# Patient Record
Sex: FEMALE | Race: WHITE | Employment: FULL TIME | URBAN - METROPOLITAN AREA
[De-identification: names, ages, dates, MRNs, and addresses within clinical notes are randomized per-mention and may not be internally consistent; named-entity substitution may affect disease eponyms.]

---

## 2017-03-31 ENCOUNTER — OFFICE VISIT (OUTPATIENT)
Dept: ENDOCRINOLOGY | Facility: CLINIC | Age: 57
End: 2017-03-31

## 2017-03-31 VITALS
HEART RATE: 72 BPM | HEIGHT: 63 IN | SYSTOLIC BLOOD PRESSURE: 129 MMHG | TEMPERATURE: 97.7 F | OXYGEN SATURATION: 98 % | DIASTOLIC BLOOD PRESSURE: 74 MMHG | WEIGHT: 235.5 LBS | BODY MASS INDEX: 41.73 KG/M2

## 2017-03-31 DIAGNOSIS — E66.01 MORBID OBESITY DUE TO EXCESS CALORIES (H): ICD-10-CM

## 2017-03-31 RX ORDER — TOPIRAMATE 25 MG/1
TABLET, FILM COATED ORAL
Qty: 150 TABLET | Refills: 5 | Status: SHIPPED | OUTPATIENT
Start: 2017-03-31 | End: 2017-08-04

## 2017-03-31 ASSESSMENT — PAIN SCALES - GENERAL: PAINLEVEL: NO PAIN (0)

## 2017-03-31 NOTE — NURSING NOTE
"Chief Complaint   Patient presents with     RECHECK     F/U       Vitals:    03/31/17 0851   BP: 129/74   BP Location: Left arm   Patient Position: Chair   Cuff Size: Adult Large   Pulse: 72   Temp: 97.7  F (36.5  C)   SpO2: 98%   Weight: 106.8 kg (235 lb 8 oz)   Height: 1.6 m (5' 3\")       Body mass index is 41.72 kg/(m^2).    Kaycee Chowdhury                          "

## 2017-03-31 NOTE — PROGRESS NOTES
"    Return Medical Weight Management Note     Marisol Anne  MRN:  [id number]  :  1960  BILL:  3/31//2017    Dear Dr. Gallegos,    I had the pleasure of seeing your patient Marisol Anne.  She is a 55 year old female who I am continuing to see for treatment of obesity related to: Back Pain, Knee Pain, GERD, Depression and anxiety    CURRENT WEIGHT:   235 lbs 8 oz    Wt Readings from Last 4 Encounters:   17 106.8 kg (235 lb 8 oz)   16 108.8 kg (239 lb 14.4 oz)   16 120.7 kg (266 lb 1.6 oz)   16 125.7 kg (277 lb 1 oz)     Height:  5' 3\"  Body Mass Index:  Body mass index is 41.72 kg/(m^2).  Vitals:  B/P: 150/76, P: 73    Initial consult weight was 285 on 12/11/15.  Weight change since last seen on  16 is down 4 pounds.   Total loss is 50 pounds.    INTERVAL HISTORY:  Still able to keep the changes - less snacking and lower starches, topiramate helping -  75 mg AM and 50 PM help with cravings during day.    Diet and Activity Changes Since Last Visit Reviewed With Patient 3/31/2017   I have made the following changes to my diet since my last visit: n0ne   With regards to my diet, I am still struggling with: sweets and eating at night  and mindless eating    For breakfast, I typically eat: protein smoothie and oatmeal   For lunch, I typically eat: chipolte bowl or rajinder hinkle sandwich   For supper, I typically eat: varies   For snack(s), I typically eat: cashews and fruit and sweets ad chips    I have made the following changes to my activity/exercise since my last visit: none   With regards to my activity/exercise, I am still struggling with: daily exercise       ROS    MEDICATIONS:   Current Outpatient Prescriptions   Medication     Probiotic Product (PRO-BIOTIC BLEND PO)     topiramate (TOPAMAX) 25 MG tablet     omeprazole (PRILOSEC) 20 MG capsule     UNABLE TO FIND     Cholecalciferol (VITAMIN D3 PO)     LEVOTHYROXINE SODIUM PO     UNABLE TO FIND     UNABLE TO FIND     " Cyanocobalamin (VITAMIN B 12 PO)     Cyanocobalamin (B-12) 2500 MCG SUBL     No current facility-administered medications for this visit.        Weight Loss Medication History Reviewed With Patient 3/31/2017   Which weight loss medications are you currently taking on a regular basis?  Topamax (topiramate)   Are you having any side effects from the weight loss medication that we have prescribed you? No   If you are having side effects please describe: -     ASSESSMENT:   Continue current plan for food change - focus on no between meal snacking, aggressive lowering of starches and cheese  Maintain topiramate 75 AM/50 PM.    FOLLOW-UP:    12 weeks.  10/15 minutes spent on counseling and education    Sincerely,    Niraj Johnson MD

## 2017-03-31 NOTE — LETTER
"3/31/2017       RE: Marisol Anne  [address]   [facility] [address]     Dear Colleague,    Thank you for referring your patient, Marisol Anne, to the [facility] MEDICAL WEIGHT MANAGEMENT at [facility]. Please see a copy of my visit note below.        Return Medical Weight Management Note     Marisol Anne  MRN:  [id number]  :  1960  BILL:  3/31//2017    Dear Dr. Gallegos,    I had the pleasure of seeing your patient Marisol Anne.  She is a 55 year old female who I am continuing to see for treatment of obesity related to: Back Pain, Knee Pain, GERD, Depression and anxiety    CURRENT WEIGHT:   235 lbs 8 oz    Wt Readings from Last 4 Encounters:   17 106.8 kg (235 lb 8 oz)   16 108.8 kg (239 lb 14.4 oz)   16 120.7 kg (266 lb 1.6 oz)   16 125.7 kg (277 lb 1 oz)     Height:  5' 3\"  Body Mass Index:  Body mass index is 41.72 kg/(m^2).  Vitals:  B/P: 150/76, P: 73    Initial consult weight was 285 on 12/11/15.  Weight change since last seen on  16 is down 4 pounds.   Total loss is 50 pounds.    INTERVAL HISTORY:  Still able to keep the changes - less snacking and lower starches, topiramate helping -  75 mg AM and 50 PM help with cravings during day.    Diet and Activity Changes Since Last Visit Reviewed With Patient 3/31/2017   I have made the following changes to my diet since my last visit: n0ne   With regards to my diet, I am still struggling with: sweets and eating at night  and mindless eating    For breakfast, I typically eat: protein smoothie and oatmeal   For lunch, I typically eat: chipolte bowl or rajinder hinkle sandwich   For supper, I typically eat: varies   For snack(s), I typically eat: cashews and fruit and sweets ad chips    I have made the following changes to my activity/exercise since my last visit: none   With regards to my activity/exercise, I am still struggling with: daily exercise "       ROS    MEDICATIONS:   Current Outpatient Prescriptions   Medication     Probiotic Product (PRO-BIOTIC BLEND PO)     topiramate (TOPAMAX) 25 MG tablet     omeprazole (PRILOSEC) 20 MG capsule     UNABLE TO FIND     Cholecalciferol (VITAMIN D3 PO)     LEVOTHYROXINE SODIUM PO     UNABLE TO FIND     UNABLE TO FIND     Cyanocobalamin (VITAMIN B 12 PO)     Cyanocobalamin (B-12) 2500 MCG SUBL     No current facility-administered medications for this visit.        Weight Loss Medication History Reviewed With Patient 3/31/2017   Which weight loss medications are you currently taking on a regular basis?  Topamax (topiramate)   Are you having any side effects from the weight loss medication that we have prescribed you? No   If you are having side effects please describe: -     ASSESSMENT:   Continue current plan for food change - focus on no between meal snacking, aggressive lowering of starches and cheese  Maintain topiramate 75 AM/50 PM.    FOLLOW-UP:    12 weeks.  10/15 minutes spent on counseling and education    Sincerely,    Niraj Johnson MD

## 2017-03-31 NOTE — MR AVS SNAPSHOT
After Visit Summary   3/31/2017    Marisol Anne    MRN: [id number]           Patient Information     Date Of Birth          1960        Visit Information        Provider Department      3/31/2017 9:00 AM Niraj Johnson MD [facility] Medical Weight Management        Today's Diagnoses     Morbid obesity due to excess calories (H)           Follow-ups after your visit        Follow-up notes from your care team     Return in about 3 months (around 2017).      Who to contact     Please call your clinic at [phone] to:    Ask questions about your health    Make or cancel appointments    Discuss your medicines    Learn about your test results    Speak to your doctor   If you have compliments or concerns about an experience at your clinic, or if you wish to file a complaint, please contact [facility] Patient Relations at [phone] or email us at [email or web address]         Additional Information About Your Visit        MyChart Information     [employer] is an electronic gateway that provides easy, online access to your medical records. With Loladex, you can request a clinic appointment, read your test results, renew a prescription or communicate with your care team.     To sign up for [employer] visit the website at [email or web address]   You will be asked to enter the access code listed below, as well as some personal information. Please follow the directions to create your username and password.     Your access code is: [email or web address]  Expires: 2017  7:30 AM     Your access code will  in 90 days. If you need help or a new code, please contact your [facility] Clinic or call [phone] for assistance.        Care EveryWhere ID     This is your Care EveryWhere ID. This could be used by other organizations to access your [facility] medical records  [id number]        Your Vitals Were     Pulse Temperature  "Height Pulse Oximetry BMI (Body Mass Index)       72 97.7  F (36.5  C) 1.6 m (5' 3\") 98% 41.72 kg/m2        Blood Pressure from Last 3 Encounters:   03/31/17 129/74   12/23/16 147/88   06/07/16 141/79    Weight from Last 3 Encounters:   03/31/17 106.8 kg (235 lb 8 oz)   12/23/16 108.8 kg (239 lb 14.4 oz)   06/07/16 120.7 kg (266 lb 1.6 oz)              Today, you had the following     No orders found for display         Where to get your medicines      These medications were sent to "[facility] [address] [place] [address] & [address], [place] [address]     Phone:  [phone]     topiramate 25 MG tablet          Primary Care Provider Office Phone # Fax #    Adrianna Gallegos [phone]       [facility] [address] [address]        Thank you!     Thank you for choosing [facility] WEIGHT MANAGEMENT  for your care. Our goal is always to provide you with excellent care. Hearing back from our patients is one way we can continue to improve our services. Please take a few minutes to complete the written survey that you may receive in the mail after your visit with us. Thank you!             Your Updated Medication List - Protect others around you: Learn how to safely use, store and throw away your medicines at www.disposemymeds.org.          This list is accurate as of: 3/31/17  9:23 AM.  Always use your most recent med list.                   Brand Name Dispense Instructions for use    LEVOTHYROXINE SODIUM PO      Take 75 mcg by mouth       omeprazole 20 MG CR capsule    priLOSEC         PRO-BIOTIC BLEND PO          topiramate 25 MG tablet    TOPAMAX    150 tablet    Take 3 tabs in the morning and 2 tabs in the evening       * UNABLE TO FIND      MEDICATION NAME: Dynamic Fruits & Greens       * UNABLE TO FIND      MEDICATION NAME: Dynamic Slender Drink       * UNABLE TO FIND      MEDICATION NAME: Probiotic 10 " mariola CFU       * VITAMIN B 12 PO          * B-12 2500 MCG Subl      DIS 1 T UNT QD       VITAMIN D3 PO      Take 2,000 Units by mouth daily       * Notice:  This list has 5 medication(s) that are the same as other medications prescribed for you. Read the directions carefully, and ask your doctor or other care provider to review them with you.

## 2017-07-19 DIAGNOSIS — K21.9 GASTROESOPHAGEAL REFLUX DISEASE, ESOPHAGITIS PRESENCE NOT SPECIFIED: ICD-10-CM

## 2017-07-19 DIAGNOSIS — K21.9 ESOPHAGEAL REFLUX: Primary | ICD-10-CM

## 2017-07-19 NOTE — TELEPHONE ENCOUNTER
omeprazole  Last Written Prescription Date:  Pt reported    Last Office Visit : 3/31/17  Future Office visit:  8/4/17    Routing refill request to provider for review/approval because:  Medication is reported/historical

## 2017-08-04 ENCOUNTER — OFFICE VISIT (OUTPATIENT)
Dept: ENDOCRINOLOGY | Facility: CLINIC | Age: 57
End: 2017-08-04

## 2017-08-04 VITALS
OXYGEN SATURATION: 100 % | DIASTOLIC BLOOD PRESSURE: 75 MMHG | HEART RATE: 71 BPM | SYSTOLIC BLOOD PRESSURE: 133 MMHG | WEIGHT: 230.6 LBS | BODY MASS INDEX: 40.86 KG/M2 | HEIGHT: 63 IN

## 2017-08-04 DIAGNOSIS — E66.01 MORBID OBESITY DUE TO EXCESS CALORIES (H): ICD-10-CM

## 2017-08-04 RX ORDER — TOPIRAMATE 25 MG/1
75 TABLET, FILM COATED ORAL 2 TIMES DAILY
Qty: 180 TABLET | Refills: 5 | Status: SHIPPED | OUTPATIENT
Start: 2017-08-04 | End: 2017-08-07

## 2017-08-04 ASSESSMENT — PAIN SCALES - GENERAL: PAINLEVEL: NO PAIN (0)

## 2017-08-04 NOTE — MR AVS SNAPSHOT
After Visit Summary   2017    Marisol Anne    MRN: [id number]           Patient Information     Date Of Birth          1960        Visit Information        Provider Department      2017 9:15 AM Niraj Johnson MD [facility] Medical Weight Management        Today's Diagnoses     Morbid obesity due to excess calories (H)           Follow-ups after your visit        Follow-up notes from your care team     Return in about 3 months (around 2017).      Who to contact     Please call your clinic at [phone] to:    Ask questions about your health    Make or cancel appointments    Discuss your medicines    Learn about your test results    Speak to your doctor   If you have compliments or concerns about an experience at your clinic, or if you wish to file a complaint, please contact [facility] Patient Relations at [phone] or email us at [email or web address]         Additional Information About Your Visit        MyChart Information     [employer] is an electronic gateway that provides easy, online access to your medical records. With Alaris, you can request a clinic appointment, read your test results, renew a prescription or communicate with your care team.     To sign up for [employer] visit the website at [email or web address]   You will be asked to enter the access code listed below, as well as some personal information. Please follow the directions to create your username and password.     Your access code is: [email or web address]  Expires: 2017  6:30 AM     Your access code will  in 90 days. If you need help or a new code, please contact your [facility] or call [phone] for assistance.        Care EveryWhere ID     This is your Care EveryWhere ID. This could be used by other organizations to access your [facility] medical records  [id number]        Your Vitals Were     Pulse Height Pulse  "Oximetry BMI (Body Mass Index)          71 1.6 m (5' 3\") 100% 40.85 kg/m2         Blood Pressure from Last 3 Encounters:   08/04/17 133/75   03/31/17 129/74   12/23/16 147/88    Weight from Last 3 Encounters:   08/04/17 104.6 kg (230 lb 9.6 oz)   03/31/17 106.8 kg (235 lb 8 oz)   12/23/16 108.8 kg (239 lb 14.4 oz)              Today, you had the following     No orders found for display         Today's Medication Changes          These changes are accurate as of: 8/4/17  9:45 AM.  If you have any questions, ask your nurse or doctor.               These medicines have changed or have updated prescriptions.        Dose/Directions    topiramate 25 MG tablet   Commonly known as:  TOPAMAX   This may have changed:    - how much to take  - how to take this  - when to take this   Used for:  Morbid obesity due to excess calories (H)   Changed by:  Niraj Johnson MD        Dose:  75 mg   Take 3 tablets (75 mg) by mouth 2 times daily Take 3 tabs in the morning and 2 tabs in the evening   Quantity:  180 tablet   Refills:  5         Stop taking these medicines if you haven't already. Please contact your care team if you have questions.     UNABLE TO FIND   Stopped by:  Niraj Johnson MD                Where to get your medicines      These medications were sent to [facility] [address] [place] [address] & [address], [place] [address]     Phone:  [phone]     topiramate 25 MG tablet                Primary Care Provider Office Phone # Fax #    Adrianna Gallegos [phone]       [facility] [address] [address]        Equal Access to Services     KATIANA OLIVEIRA AH: Debbi Thomas, pham guerrero, latisha west, ginny Delarosa [facility] [phone].    ATENCIÓN: Si habla español, tiene a solorzano disposición servicios gratuitos de asistencia lingüística. " Kay delaney [phone].    We comply with applicable federal civil rights laws and [place] laws. We do not discriminate on the basis of race, color, national origin, age, disability sex, sexual orientation or gender identity.            Thank you!     Thank you for choosing [facility] MEDICAL WEIGHT MANAGEMENT  for your care. Our goal is always to provide you with excellent care. Hearing back from our patients is one way we can continue to improve our services. Please take a few minutes to complete the written survey that you may receive in the mail after your visit with us. Thank you!             Your Updated Medication List - Protect others around you: Learn how to safely use, store and throw away your medicines at www.disposemymeds.org.          This list is accurate as of: 8/4/17  9:45 AM.  Always use your most recent med list.                   Brand Name Dispense Instructions for use Diagnosis    LEVOTHYROXINE SODIUM PO      Take 75 mcg by mouth        omeprazole 20 MG CR capsule    priLOSEC    90 capsule    1 cap daily before a meal    Gastroesophageal reflux disease, esophagitis presence not specified       PRO-BIOTIC BLEND PO           topiramate 25 MG tablet    TOPAMAX    180 tablet    Take 3 tablets (75 mg) by mouth 2 times daily Take 3 tabs in the morning and 2 tabs in the evening    Morbid obesity due to excess calories (H)       * VITAMIN B 12 PO           * B-12 2500 MCG Subl      DIS 1 T UNT QD        VITAMIN D3 PO      Take 2,000 Units by mouth daily        * Notice:  This list has 2 medication(s) that are the same as other medications prescribed for you. Read the directions carefully, and ask your doctor or other care provider to review them with you.

## 2017-08-04 NOTE — NURSING NOTE
"Chief Complaint   Patient presents with     Weight Problem     RMWM- 3 Month       Vitals:    08/04/17 0920   BP: 133/75   BP Location: Left arm   Patient Position: Chair   Cuff Size: Adult Regular   Pulse: 71   SpO2: 100%   Weight: 230 lb 9.6 oz   Height: 5' 3\"       Body mass index is 40.85 kg/(m^2).      Kaycee Chowdhury                          "

## 2017-08-04 NOTE — PROGRESS NOTES
"    Return Medical Weight Management Note     Marisol Anne  MRN:  [id number]  :  1960  BILL:  2017    Dear Dr. Gallegos,    I had the pleasure of seeing your patient Marisol Anne.  She is a 55 year old female who I am continuing to see for treatment of obesity related to: Back Pain, Knee Pain, GERD, Depression and anxiety    CURRENT WEIGHT:   230 lbs 9.6 oz    Wt Readings from Last 4 Encounters:   17 104.6 kg (230 lb 9.6 oz)   17 106.8 kg (235 lb 8 oz)   16 108.8 kg (239 lb 14.4 oz)   16 120.7 kg (266 lb 1.6 oz)     Height:  5' 3\"  Body Mass Index:  Body mass index is 40.85 kg/(m^2).  Vitals:  B/P: 150/76, P: 73    Initial consult weight was 285 on 12/11/15.  Weight change since last seen on  3/31/2017 is down 5 pounds.   Total loss is 55 pounds.    INTERVAL HISTORY:  Still able to keep the changes - less snacking and lower starches, topiramate helping, though she self increased -  75 mg AM and 75 PM help with cravings during day.    Diet and Activity Changes Since Last Visit Reviewed With Patient 2017   I have made the following changes to my diet since my last visit: none   With regards to my diet, I am still struggling with: sweets and consistentency   For breakfast, I typically eat: protein drink and oatmeal and fruit    For lunch, I typically eat: varies from sandwich or pizza and fruit chips   For supper, I typically eat: sandwich chips fruit or misc meals    For snack(s), I typically eat: nuts scone fruit sweets chips   I have made the following changes to my activity/exercise since my last visit: walking more and pool at my place .gymtwicea week   With regards to my activity/exercise, I am still struggling with: consistenccty       ROS    MEDICATIONS:   Current Outpatient Prescriptions   Medication     omeprazole (PRILOSEC) 20 MG CR capsule     Probiotic Product (PRO-BIOTIC BLEND PO)     Cyanocobalamin (B-12) 2500 MCG SUBL     topiramate (TOPAMAX) 25 MG tablet     " Cholecalciferol (VITAMIN D3 PO)     LEVOTHYROXINE SODIUM PO     Cyanocobalamin (VITAMIN B 12 PO)     No current facility-administered medications for this visit.        Weight Loss Medication History Reviewed With Patient 8/4/2017   Which weight loss medications are you currently taking on a regular basis?  Topamax (topiramate)   Are you having any side effects from the weight loss medication that we have prescribed you? No   If you are having side effects please describe: -     ASSESSMENT:   Continue current plan for food change - focus on no between meal snacking, aggressive lowering of starches and cheese  Maintain topiramate 75 AM/75 PM.    FOLLOW-UP:    12 weeks.  10/15 minutes spent on counseling and education    Sincerely,    Niraj Johnson MD

## 2017-08-04 NOTE — LETTER
"2017       RE: Marisol Anne  [address]   [place] [facility] [address]     Dear Colleague,    Thank you for referring your patient, Marisol Anne, to the [facility] MEDICAL WEIGHT MANAGEMENT at [facility]. Please see a copy of my visit note below.        Return Medical Weight Management Note     Marisol Anne  MRN:  [id number]  :  1960  BILL:  2017    Dear Dr. Gallegos,    I had the pleasure of seeing your patient Marisol Anne.  She is a 55 year old female who I am continuing to see for treatment of obesity related to: Back Pain, Knee Pain, GERD, Depression and anxiety    CURRENT WEIGHT:   230 lbs 9.6 oz    Wt Readings from Last 4 Encounters:   17 104.6 kg (230 lb 9.6 oz)   17 106.8 kg (235 lb 8 oz)   16 108.8 kg (239 lb 14.4 oz)   16 120.7 kg (266 lb 1.6 oz)     Height:  5' 3\"  Body Mass Index:  Body mass index is 40.85 kg/(m^2).  Vitals:  B/P: 150/76, P: 73    Initial consult weight was 285 on 12/11/15.  Weight change since last seen on  3/31/2017 is down 5 pounds.   Total loss is 55 pounds.    INTERVAL HISTORY:  Still able to keep the changes - less snacking and lower starches, topiramate helping, though she self increased -  75 mg AM and 75 PM help with cravings during day.    Diet and Activity Changes Since Last Visit Reviewed With Patient 2017   I have made the following changes to my diet since my last visit: none   With regards to my diet, I am still struggling with: sweets and consistentency   For breakfast, I typically eat: protein drink and oatmeal and fruit    For lunch, I typically eat: varies from sandwich or pizza and fruit chips   For supper, I typically eat: sandwich chips fruit or misc meals    For snack(s), I typically eat: nuts scone fruit sweets chips   I have made the following changes to my activity/exercise since my last visit: walking more and pool at my place .gymtwicea week   With regards to my " activity/exercise, I am still struggling with: consistenccty       ROS    MEDICATIONS:   Current Outpatient Prescriptions   Medication     omeprazole (PRILOSEC) 20 MG CR capsule     Probiotic Product (PRO-BIOTIC BLEND PO)     Cyanocobalamin (B-12) 2500 MCG SUBL     topiramate (TOPAMAX) 25 MG tablet     Cholecalciferol (VITAMIN D3 PO)     LEVOTHYROXINE SODIUM PO     Cyanocobalamin (VITAMIN B 12 PO)     No current facility-administered medications for this visit.        Weight Loss Medication History Reviewed With Patient 8/4/2017   Which weight loss medications are you currently taking on a regular basis?  Topamax (topiramate)   Are you having any side effects from the weight loss medication that we have prescribed you? No   If you are having side effects please describe: -     ASSESSMENT:   Continue current plan for food change - focus on no between meal snacking, aggressive lowering of starches and cheese  Maintain topiramate 75 AM/75 PM.    FOLLOW-UP:    12 weeks.  10/15 minutes spent on counseling and education    Sincerely,    Niraj Johnson MD

## 2017-08-07 DIAGNOSIS — E66.01 MORBID OBESITY DUE TO EXCESS CALORIES (H): ICD-10-CM

## 2017-08-07 RX ORDER — TOPIRAMATE 25 MG/1
75 TABLET, FILM COATED ORAL 2 TIMES DAILY
Qty: 180 TABLET | Refills: 5 | Status: SHIPPED | OUTPATIENT
Start: 2017-08-04 | End: 2018-06-25

## 2018-06-25 ENCOUNTER — OFFICE VISIT (OUTPATIENT)
Dept: ENDOCRINOLOGY | Facility: CLINIC | Age: 58
End: 2018-06-25
Payer: COMMERCIAL

## 2018-06-25 VITALS
DIASTOLIC BLOOD PRESSURE: 71 MMHG | WEIGHT: 216.4 LBS | OXYGEN SATURATION: 100 % | TEMPERATURE: 98.5 F | SYSTOLIC BLOOD PRESSURE: 124 MMHG | BODY MASS INDEX: 38.34 KG/M2 | HEIGHT: 63 IN | HEART RATE: 63 BPM

## 2018-06-25 DIAGNOSIS — E66.01 MORBID OBESITY DUE TO EXCESS CALORIES (H): ICD-10-CM

## 2018-06-25 RX ORDER — TOPIRAMATE 25 MG/1
75 TABLET, FILM COATED ORAL 2 TIMES DAILY
Qty: 180 TABLET | Refills: 5 | Status: SHIPPED | OUTPATIENT
Start: 2018-06-25 | End: 2019-03-25

## 2018-06-25 ASSESSMENT — PAIN SCALES - GENERAL: PAINLEVEL: NO PAIN (0)

## 2018-06-25 NOTE — NURSING NOTE
"(   Chief Complaint   Patient presents with     Weight Check     f/u     )    ( Weight: 98.2 kg (216 lb 6.4 oz) )  ( Height: 160 cm (5' 3\") )  ( BMI (Calculated): 38.41 )  (   )  (   )  (   )  (   )  (   )  (   )    ( BP: 124/71 )  (   )  ( Temp: 98.5  F (36.9  C) )  ( Temp src: Oral )  ( Pulse: 63 )  (   )  ( SpO2: 100 % )    ( There is no problem list on file for this patient.   )  (   Current Outpatient Prescriptions   Medication Sig Dispense Refill     Cholecalciferol (VITAMIN D3 PO) Take 2,000 Units by mouth daily       Cyanocobalamin (B-12) 2500 MCG SUBL DIS 1 T UNT QD  1     Cyanocobalamin (VITAMIN B 12 PO)        LEVOTHYROXINE SODIUM PO Take 75 mcg by mouth       omeprazole (PRILOSEC) 20 MG CR capsule 1 cap daily before a meal 90 capsule 3     Probiotic Product (PRO-BIOTIC BLEND PO)        topiramate (TOPAMAX) 25 MG tablet Take 3 tablets (75 mg) by mouth 2 times daily Take 3 tabs in the morning and 3 tabs in the evening 180 tablet 5    )  ( Diabetes Eval:    )    ( Pain Eval:  No Pain (0) )    ( Wound Eval:       )    (   History   Smoking Status     Never Smoker   Smokeless Tobacco     Never Used    )    ( Signed By:  George Conklin; June 25, 2018; 8:14 AM )    "

## 2018-06-25 NOTE — MR AVS SNAPSHOT
After Visit Summary   2018    Marisol Anne    MRN: [id number]           Patient Information     Date Of Birth          1960        Visit Information        Provider Department      2018 8:00 AM Niraj Johnson MD M [facility] Medical Weight Management        Today's Diagnoses     Morbid obesity due to excess calories (H)           Follow-ups after your visit        Follow-up notes from your care team     Return in about 4 months (around 10/25/2018).      Your next 10 appointments already scheduled     2018 10:30 AM CST   (Arrive by 10:15 AM)   Return Visit with MD DAVID Mc [facility] Medical Weight Management ([facility] [place])    [address] [address] [phone]              Who to contact     Please call your clinic at [phone] to:    Ask questions about your health    Make or cancel appointments    Discuss your medicines    Learn about your test results    Speak to your doctor            Additional Information About Your Visit        MyChart Information     [employer] is an electronic gateway that provides easy, online access to your medical records. With [employer], you can request a clinic appointment, read your test results, renew a prescription or communicate with your care team.     To sign up for Self-A-r-Tt visit the website at [email or web address]   You will be asked to enter the access code listed below, as well as some personal information. Please follow the directions to create your username and password.     Your access code is: [email or web address]  Expires: 2018  6:30 AM     Your access code will  in 90 days. If you need help or a new code, please contact your [facility] or call [phone] for assistance.        Care EveryWhere ID     This is your Care EveryWhere ID. This could be used by other organizations to access your [facility]  "records  [id number]        Your Vitals Were     Pulse Temperature Height Pulse Oximetry BMI (Body Mass Index)       63 98.5  F (36.9  C) (Oral) 1.6 m (5' 3\") 100% 38.33 kg/m2        Blood Pressure from Last 3 Encounters:   06/25/18 124/71   08/04/17 133/75   03/31/17 129/74    Weight from Last 3 Encounters:   06/25/18 98.2 kg (216 lb 6.4 oz)   08/04/17 104.6 kg (230 lb 9.6 oz)   03/31/17 106.8 kg (235 lb 8 oz)              Today, you had the following     No orders found for display         Where to get your medicines      These [facility] [address] [place] [address] & [address], [place] [address]     Phone:  [phone]     topiramate 25 MG tablet          Primary Care Provider Office Phone # Fax #    Adrianna Gallegos [phone]       [facility] [address] [address]        Equal Access to Services     MARCELLO OLIVEIRA AH: Hadii axel benjamin hadasho Soomaali, waaxda luqadaha, qaybta kaalmada adeegyada, ginny kaur. So [facility] [phone].    ATENCIÓN: Si habla español, tiene a solorzano disposición servicios gratuitos de asistencia lingüística. Hannaame al [phone].    We comply with applicable federal civil rights laws and [place] laws. We do not discriminate on the basis of race, color, national origin, age, disability, sex, sexual orientation, or gender identity.            Thank you!     Thank you for choosing [facility] WEIGHT MANAGEMENT  for your care. Our goal is always to provide you with excellent care. Hearing back from our patients is one way we can continue to improve our services. Please take a few minutes to complete the written survey that you may receive in the mail after your visit with us. Thank you!             Your Updated Medication List - Protect others around you: Learn how to safely use, store and throw away your medicines at " www.disposemymeds.org.          This list is accurate as of 6/25/18  8:28 AM.  Always use your most recent med list.                   Brand Name Dispense Instructions for use Diagnosis    LEVOTHYROXINE SODIUM PO      Take 75 mcg by mouth        omeprazole 20 MG CR capsule    priLOSEC    90 capsule    1 cap daily before a meal    Gastroesophageal reflux disease, esophagitis presence not specified       PRO-BIOTIC BLEND PO           topiramate 25 MG tablet    TOPAMAX    180 tablet    Take 3 tablets (75 mg) by mouth 2 times daily Take 3 tabs in the morning and 3 tabs in the evening    Morbid obesity due to excess calories (H)       * VITAMIN B 12 PO           * B-12 2500 MCG Subl      DIS 1 T UNT QD        VITAMIN D3 PO      Take 2,000 Units by mouth daily        * Notice:  This list has 2 medication(s) that are the same as other medications prescribed for you. Read the directions carefully, and ask your doctor or other care provider to review them with you.

## 2018-06-25 NOTE — PROGRESS NOTES
"    Return Medical Weight Management Note     Marisol Anne  MRN:  [id number]  :  1960  BILL:  18    Dear Dr. Gallegos,    I had the pleasure of seeing your patient Marisol Anne.  She is a 55 year old female who I am continuing to see for treatment of obesity related to: Back Pain, Knee Pain, GERD, Depression and anxiety    CURRENT WEIGHT:   216 lbs 6.4 oz    Wt Readings from Last 4 Encounters:   18 98.2 kg (216 lb 6.4 oz)   17 104.6 kg (230 lb 9.6 oz)   17 106.8 kg (235 lb 8 oz)   16 108.8 kg (239 lb 14.4 oz)     Height:  5' 3\"  Body Mass Index:  Body mass index is 38.33 kg/(m^2).  Vitals:  B/P: 150/76, P: 73    Initial consult weight was 285 on 12/11/15.  Weight change since last seen on 2017 is down 14 pounds.   Total loss is 69 pounds.    INTERVAL HISTORY:  Still less snacking and lower starches, topiramate 75 mg AM and 75 PM helps with cravings during day.    Diet and Activity Changes Since Last Visit Reviewed With Patient 2018   I have made the following changes to my diet since my last visit: n0ne   With regards to my diet, I am still struggling with: consistent   For breakfast, I typically eat: -   For lunch, I typically eat: -   For supper, I typically eat: -   For snack(s), I typically eat: -   I have made the following changes to my activity/exercise since my last visit: new job at [employer] and very physical    With regards to my activity/exercise, I am still struggling with: normal exercise       ROS    MEDICATIONS:   Current Outpatient Prescriptions   Medication     Cholecalciferol (VITAMIN D3 PO)     Cyanocobalamin (B-12) 2500 MCG SUBL     Cyanocobalamin (VITAMIN B 12 PO)     LEVOTHYROXINE SODIUM PO     omeprazole (PRILOSEC) 20 MG CR capsule     Probiotic Product (PRO-BIOTIC BLEND PO)     topiramate (TOPAMAX) 25 MG tablet     No current facility-administered medications for this visit.        Weight Loss Medication History Reviewed With Patient 2018 "   Which weight loss medications are you currently taking on a regular basis?  Topamax (topiramate)   Are you having any side effects from the weight loss medication that we have prescribed you? No   If you are having side effects please describe: -     ASSESSMENT:   Continue current plan for food change - focus on no between meal snacking, aggressive lowering of starches and cheese  Maintain topiramate 75 AM/75 PM.    FOLLOW-UP:    12 weeks.  10/15 minutes spent on counseling and education    Sincerely,    Niraj Johnson MD

## 2018-06-25 NOTE — LETTER
"2018       RE: Marisol Anne  [address]  [place] [address]     Dear Colleague,    Thank you for referring your patient, Marisol Anne, to the [facility] MEDICAL WEIGHT MANAGEMENT at [facility]. Please see a copy of my visit note below.        Return Medical Weight Management Note     Marisol Anne  MRN:  [id number]  :  1960  BILL:  18    Dear Dr. Gallegos,    I had the pleasure of seeing your patient Marisol Anne.  She is a 55 year old female who I am continuing to see for treatment of obesity related to: Back Pain, Knee Pain, GERD, Depression and anxiety    CURRENT WEIGHT:   216 lbs 6.4 oz    Wt Readings from Last 4 Encounters:   18 98.2 kg (216 lb 6.4 oz)   17 104.6 kg (230 lb 9.6 oz)   17 106.8 kg (235 lb 8 oz)   16 108.8 kg (239 lb 14.4 oz)     Height:  5' 3\"  Body Mass Index:  Body mass index is 38.33 kg/(m^2).  Vitals:  B/P: 150/76, P: 73    Initial consult weight was 285 on 12/11/15.  Weight change since last seen on 2017 is down 14 pounds.   Total loss is 69 pounds.    INTERVAL HISTORY:  Still less snacking and lower starches, topiramate 75 mg AM and 75 PM helps with cravings during day.    Diet and Activity Changes Since Last Visit Reviewed With Patient 2018   I have made the following changes to my diet since my last visit: n0ne   With regards to my diet, I am still struggling with: consistent   For breakfast, I typically eat: -   For lunch, I typically eat: -   For supper, I typically eat: -   For snack(s), I typically eat: -   I have made the following changes to my activity/exercise since my last visit: new job at [employer] and very physical    With regards to my activity/exercise, I am still struggling with: normal exercise       ROS    MEDICATIONS:   Current Outpatient Prescriptions   Medication     Cholecalciferol (VITAMIN D3 PO)     Cyanocobalamin (B-12) 2500 MCG SUBL     Cyanocobalamin " (VITAMIN B 12 PO)     LEVOTHYROXINE SODIUM PO     omeprazole (PRILOSEC) 20 MG CR capsule     Probiotic Product (PRO-BIOTIC BLEND PO)     topiramate (TOPAMAX) 25 MG tablet     No current facility-administered medications for this visit.        Weight Loss Medication History Reviewed With Patient 6/25/2018   Which weight loss medications are you currently taking on a regular basis?  Topamax (topiramate)   Are you having any side effects from the weight loss medication that we have prescribed you? No   If you are having side effects please describe: -     ASSESSMENT:   Continue current plan for food change - focus on no between meal snacking, aggressive lowering of starches and cheese  Maintain topiramate 75 AM/75 PM.    FOLLOW-UP:    12 weeks.  10/15 minutes spent on counseling and education    Sincerely,    Niraj Johnson MD

## 2018-07-17 ENCOUNTER — HEALTH MAINTENANCE LETTER (OUTPATIENT)
Age: 58
End: 2018-07-17

## 2018-08-10 DIAGNOSIS — K21.9 ESOPHAGEAL REFLUX: ICD-10-CM

## 2018-11-05 ENCOUNTER — OFFICE VISIT (OUTPATIENT)
Dept: ENDOCRINOLOGY | Facility: CLINIC | Age: 58
End: 2018-11-05
Payer: COMMERCIAL

## 2018-11-05 VITALS
DIASTOLIC BLOOD PRESSURE: 71 MMHG | WEIGHT: 198.1 LBS | TEMPERATURE: 98.1 F | OXYGEN SATURATION: 99 % | HEIGHT: 63 IN | BODY MASS INDEX: 35.1 KG/M2 | RESPIRATION RATE: 18 BRPM | SYSTOLIC BLOOD PRESSURE: 129 MMHG | HEART RATE: 63 BPM

## 2018-11-05 DIAGNOSIS — E66.01 MORBID OBESITY (H): Primary | ICD-10-CM

## 2018-11-05 ASSESSMENT — PAIN SCALES - GENERAL: PAINLEVEL: NO PAIN (0)

## 2018-11-05 NOTE — PROGRESS NOTES
"    Return Medical Weight Management Note     Marisol Anne  MRN:  [id number]  :  1960  BILL:  18    Dear Dr. Gallegos,    I had the pleasure of seeing your patient Marisol Anne.  She is a 55 year old female who I am continuing to see for treatment of obesity related to: Back Pain, Knee Pain, GERD, Depression and anxiety    CURRENT WEIGHT:   198 lbs 1.6 oz    Wt Readings from Last 4 Encounters:   18 89.9 kg (198 lb 1.6 oz)   18 98.2 kg (216 lb 6.4 oz)   17 104.6 kg (230 lb 9.6 oz)   17 106.8 kg (235 lb 8 oz)     Height:  5' 3\"  Body Mass Index:  Body mass index is 35.09 kg/(m^2).  Vitals:  B/P: 150/76, P: 73    Initial consult weight was 285 on 12/11/15.  Weight change since last seen on 18 is down 18 pounds.   Total loss is 87 pounds.    INTERVAL HISTORY:  Still less snacking and lower starches, topiramate 75 mg AM and 75 PM helps with cravings during day.    Diet and Activity Changes Since Last Visit Reviewed With Patient 2018   I have made the following changes to my diet since my last visit: none   With regards to my diet, I am still struggling with: regular meals and fruits and vegees   For breakfast, I typically eat: -   For lunch, I typically eat: -   For supper, I typically eat: -   For snack(s), I typically eat: -   I have made the following changes to my activity/exercise since my last visit: lots of walking   With regards to my activity/exercise, I am still struggling with: cardio       ROS    MEDICATIONS:   Current Outpatient Prescriptions   Medication     ASPIRIN PO     CALCIUM 500/D 500-400 MG-UNIT CHEW     Cholecalciferol (VITAMIN D3 PO)     Cyanocobalamin (B-12) 2500 MCG SUBL     Cyanocobalamin (VITAMIN B 12 PO)     LEVOTHYROXINE SODIUM PO     omeprazole (PRILOSEC) 20 MG CR capsule     omeprazole (PRILOSEC) 20 MG CR capsule     Probiotic Product (PRO-BIOTIC BLEND PO)     topiramate (TOPAMAX) 25 MG tablet     No current facility-administered " medications for this visit.        Weight Loss Medication History Reviewed With Patient 11/5/2018   Which weight loss medications are you currently taking on a regular basis?  Topamax (topiramate)   Are you having any side effects from the weight loss medication that we have prescribed you? No   If you are having side effects please describe: -     ASSESSMENT:   Continue current plan for food change - focus on no between meal snacking, aggressive lowering of starches and cheese  Maintain topiramate 75 AM/75 PM.    FOLLOW-UP:    12 weeks.  10/15 minutes spent on counseling and education    Sincerely,    Niraj Johnson MD

## 2018-11-05 NOTE — LETTER
"2018     RE: Marisol Anne  [address]  [place] [address]     Dear Colleague,    Thank you for referring your patient, Marisol Anne, to the [facility] MEDICAL WEIGHT MANAGEMENT at [facility]. Please see a copy of my visit note below.    Return Medical Weight Management Note     Marisol Anne  MRN:  [id number]  :  1960  BILL:  18    Dear Dr. Gallegos,    I had the pleasure of seeing your patient Marisol Anne.  She is a 55 year old female who I am continuing to see for treatment of obesity related to: Back Pain, Knee Pain, GERD, Depression and anxiety    CURRENT WEIGHT:   198 lbs 1.6 oz    Wt Readings from Last 4 Encounters:   18 89.9 kg (198 lb 1.6 oz)   18 98.2 kg (216 lb 6.4 oz)   17 104.6 kg (230 lb 9.6 oz)   17 106.8 kg (235 lb 8 oz)     Height:  5' 3\"  Body Mass Index:  Body mass index is 35.09 kg/(m^2).  Vitals:  B/P: 150/76, P: 73    Initial consult weight was 285 on 12/11/15.  Weight change since last seen on 18 is down 18 pounds.   Total loss is 87 pounds.    INTERVAL HISTORY:  Still less snacking and lower starches, topiramate 75 mg AM and 75 PM helps with cravings during day.    Diet and Activity Changes Since Last Visit Reviewed With Patient 2018   I have made the following changes to my diet since my last visit: none   With regards to my diet, I am still struggling with: regular meals and fruits and vegees   For breakfast, I typically eat: -   For lunch, I typically eat: -   For supper, I typically eat: -   For snack(s), I typically eat: -   I have made the following changes to my activity/exercise since my last visit: lots of walking   With regards to my activity/exercise, I am still struggling with: cardio       ROS    MEDICATIONS:   Current Outpatient Prescriptions   Medication     ASPIRIN PO     CALCIUM 500/D 500-400 MG-UNIT CHEW     Cholecalciferol (VITAMIN D3 PO)     Cyanocobalamin " (B-12) 2500 MCG SUBL     Cyanocobalamin (VITAMIN B 12 PO)     LEVOTHYROXINE SODIUM PO     omeprazole (PRILOSEC) 20 MG CR capsule     omeprazole (PRILOSEC) 20 MG CR capsule     Probiotic Product (PRO-BIOTIC BLEND PO)     topiramate (TOPAMAX) 25 MG tablet     No current facility-administered medications for this visit.        Weight Loss Medication History Reviewed With Patient 11/5/2018   Which weight loss medications are you currently taking on a regular basis?  Topamax (topiramate)   Are you having any side effects from the weight loss medication that we have prescribed you? No   If you are having side effects please describe: -     ASSESSMENT:   Continue current plan for food change - focus on no between meal snacking, aggressive lowering of starches and cheese  Maintain topiramate 75 AM/75 PM.    FOLLOW-UP:    12 weeks.  10/15 minutes spent on counseling and education    Sincerely,    Niraj Johnson MD

## 2018-11-05 NOTE — NURSING NOTE
"Chief Complaint   Patient presents with     Weight Problem     Pt here for weight management follow up       Vitals:    11/05/18 1045   BP: 129/71   BP Location: Left arm   Patient Position: Sitting   Cuff Size: Adult Large   Pulse: 63   Resp: 18   Temp: 98.1  F (36.7  C)   TempSrc: Oral   SpO2: 99%   Weight: 198 lb 1.6 oz   Height: 5' 3\"       Body mass index is 35.09 kg/(m^2).      GABINO Verdin, EMT                      "

## 2018-11-05 NOTE — MR AVS SNAPSHOT
"              After Visit Summary   11/5/2018    Marisol Anne    MRN: [id number]           Patient Information     Date Of Birth          1960        Visit Information        Provider Department      11/5/2018 10:30 AM Niraj Johnson MD M [facility] Medical Weight Management        Today's Diagnoses     Morbid obesity (H)    -  1       Follow-ups after your visit        Follow-up notes from your care team     Return in about 4 months (around 3/5/2019).      Who to contact     Please call your clinic at [phone] to:    Ask questions about your health    Make or cancel appointments    Discuss your medicines    Learn about your test results    Speak to your doctor            Additional Information About Your Visit        [email or web address] Information     [employer] gives you secure access to your electronic health record. If you see a primary care provider, you can also send messages to your care team and make appointments. If you have questions, please call your primary care clinic.  If you do not have a primary care provider, please call [phone] and they will assist you.      [employer] is an electronic gateway that provides easy, online access to your medical records. With [employer], you can request a clinic appointment, read your test results, renew a prescription or communicate with your care team.     To access your existing account, please contact your [facility] or call [phone] for assistance.        Care EveryWhere ID     This is your Care EveryWhere ID. This could be used by other organizations to access your [place] medical records  [id number]        Your Vitals Were     Pulse Temperature Respirations Height Pulse Oximetry BMI (Body Mass Index)    63 98.1  F (36.7  C) (Oral) 18 1.6 m (5' 3\") 99% 35.09 kg/m2       Blood Pressure from Last 3 Encounters:   11/05/18 129/71   06/25/18 124/71   08/04/17 133/75    Weight from Last 3 Encounters:   11/05/18 89.9 kg (198 " lb 1.6 oz)   06/25/18 98.2 kg (216 lb 6.4 oz)   08/04/17 104.6 kg (230 lb 9.6 oz)              Today, you had the following     No orders found for display       Primary Care Provider Office Phone # Fax #    Adrianna Gallegos [phone]       [facility] [address]  [facility] [address]        Equal Access to Services     KATIANA OLIVEIRA : Hadii aad ku hadasho Soomaali, waaxda luqadaha, qaybta kaalmada adeegyada, waxay idiin hayaan adeeg mercedezgail ladianna . So [facility] [phone].    ATENCIÓN: Si javonla espkaron, tiene a solorzano disposición servicios gratuitos de asistencia lingüística. Hannabetty al [phone].    We comply with applicable federal civil rights laws and [place] laws. We do not discriminate on the basis of race, color, national origin, age, disability, sex, sexual orientation, or gender identity.            Thank you!     Thank you for choosing [facility] MEDICAL WEIGHT MANAGEMENT  for your care. Our goal is always to provide you with excellent care. Hearing back from our patients is one way we can continue to improve our services. Please take a few minutes to complete the written survey that you may receive in the mail after your visit with us. Thank you!             Your Updated Medication List - Protect others around you: Learn how to safely use, store and throw away your medicines at www.disposemymeds.org.          This list is accurate as of 11/5/18 10:59 AM.  Always use your most recent med list.                   Brand Name Dispense Instructions for use Diagnosis    ASPIRIN PO      Take 81 mg by mouth        calcium 500/D 500-400 MG-UNIT Chew   Generic drug:  Calcium Carb-Cholecalciferol      CSW 1 T PO BID B MEALS        LEVOTHYROXINE SODIUM PO      Take 75 mcg by mouth        * omeprazole 20 MG CR capsule    priLOSEC    90 capsule    1 cap daily before a meal    Gastroesophageal reflux disease, esophagitis presence not specified       * omeprazole 20 MG CR capsule     priLOSEC    90 capsule    1 cap daily before a meal    Esophageal reflux       PRO-BIOTIC BLEND PO           topiramate 25 MG tablet    TOPAMAX    180 tablet    Take 3 tablets (75 mg) by mouth 2 times daily Take 3 tabs in the morning and 3 tabs in the evening    Morbid obesity due to excess calories (H)       * VITAMIN B 12 PO           * B-12 2500 MCG Subl      DIS 1 T UNT QD        VITAMIN D3 PO      Take 2,000 Units by mouth daily        * Notice:  This list has 4 medication(s) that are the same as other medications prescribed for you. Read the directions carefully, and ask your doctor or other care provider to review them with you.

## 2019-02-18 ENCOUNTER — DOCUMENTATION ONLY (OUTPATIENT)
Dept: CARE COORDINATION | Facility: CLINIC | Age: 59
End: 2019-02-18

## 2019-03-25 ENCOUNTER — OFFICE VISIT (OUTPATIENT)
Dept: ENDOCRINOLOGY | Facility: CLINIC | Age: 59
End: 2019-03-25
Payer: COMMERCIAL

## 2019-03-25 VITALS
BODY MASS INDEX: 33.13 KG/M2 | SYSTOLIC BLOOD PRESSURE: 119 MMHG | HEIGHT: 63 IN | HEART RATE: 64 BPM | DIASTOLIC BLOOD PRESSURE: 70 MMHG | WEIGHT: 187 LBS | OXYGEN SATURATION: 100 %

## 2019-03-25 DIAGNOSIS — E66.01 MORBID OBESITY DUE TO EXCESS CALORIES (H): ICD-10-CM

## 2019-03-25 RX ORDER — TOPIRAMATE 25 MG/1
TABLET, FILM COATED ORAL
Qty: 210 TABLET | Refills: 5 | Status: SHIPPED | OUTPATIENT
Start: 2019-03-25 | End: 2019-10-07

## 2019-03-25 ASSESSMENT — PAIN SCALES - GENERAL: PAINLEVEL: NO PAIN (0)

## 2019-03-25 ASSESSMENT — MIFFLIN-ST. JEOR: SCORE: 1392.36

## 2019-03-25 NOTE — PROGRESS NOTES
"    Return Medical Weight Management Note     Marisol Anne  MRN:  [id number]  :  1960  BILL:  19    Dear Dr. Gallegos,    I had the pleasure of seeing your patient Marisol Anne.  She is a 55 year old female who I am continuing to see for treatment of obesity related to: Back Pain, Knee Pain, GERD, Depression and anxiety. S/p gastric bypass surgery 12 years ago.     CURRENT WEIGHT:   187 lbs 0 oz    Wt Readings from Last 4 Encounters:   19 84.8 kg (187 lb)   18 89.9 kg (198 lb 1.6 oz)   18 98.2 kg (216 lb 6.4 oz)   17 104.6 kg (230 lb 9.6 oz)     Height:  5' 3\"  Body Mass Index:  Body mass index is 33.13 kg/m .  Vitals:  B/P: 150/76, P: 73    Initial consult weight was 285 on 12/11/15.  Weight change since last seen on 18 is down 11 pounds.   Total loss is 98 pounds.    INTERVAL HISTORY:  Still less snacking and lower starches, topiramate 100 mg AM and 75 PM (dose increased herself from 75 mg BID), does not think this has been helping. She has had tingling and numbness in her hands, but attributes this to carpal tunnel and working with boxes at work. Does occasionally notice blurred vision, unsure if from topiramate or from needing a new prescription for her glasses.     Finds she has been grabbing sweets and pre-packaged foods because she works, and finds this easier. Has been trying to prepare meals in her [employer] pot 2x/week.     Has struggled with eating vegetables every day. Generally does eat 3 meals per day and snacks between meals, mostly in the evening.     Breakfast - yogurt, toast, orange juice, sometimes scrambled eggs or cereal instead   Lunch - varies, mostly chicken wings, [employer] turkey sub with chips   Dinner - varies, pizza, sub, chili, chicken noodle soup, Mexican food, Citizen of the [place] food   Snacks - almost always oreos or M&Ms primarily in the morning or evening, but not throughout the day     Gets a lot of exercise with work - [employer] and [employer]. Reports " she has not been sleeping well, generally 5-6 hours per night.    Diet and Activity Changes Since Last Visit Reviewed With Patient 3/25/2019   I have made the following changes to my diet since my last visit: none   With regards to my diet, I am still struggling with: sweets   For breakfast, I typically eat: -   For lunch, I typically eat: -   For supper, I typically eat: -   For snack(s), I typically eat: -   I have made the following changes to my activity/exercise since my last visit: work two high activity jobs   With regards to my activity/exercise, I am still struggling with: regular exercise       ROS  + difficulty sleeping, mild headache   - fevers, URI symptoms, chest pain, shortness of breath, abdominal pain, diarrhea, constipation     MEDICATIONS:   Current Outpatient Medications   Medication     ASPIRIN PO     CALCIUM 500/D 500-400 MG-UNIT CHEW     Cholecalciferol (VITAMIN D3 PO)     Cyanocobalamin (B-12) 2500 MCG SUBL     Ferrous Sulfate (IRON) 28 MG TABS     LEVOTHYROXINE SODIUM PO     omeprazole (PRILOSEC) 20 MG CR capsule     Probiotic Product (PRO-BIOTIC BLEND PO)     topiramate (TOPAMAX) 25 MG tablet     Cyanocobalamin (VITAMIN B 12 PO)     omeprazole (PRILOSEC) 20 MG CR capsule     No current facility-administered medications for this visit.        Weight Loss Medication History Reviewed With Patient 3/25/2019   Which weight loss medications are you currently taking on a regular basis?  Topamax (topiramate)   Are you having any side effects from the weight loss medication that we have prescribed you? No   If you are having side effects please describe: -     ASSESSMENT:   Continue current plan for food change - focus on no between meal snacking, aggressive lowering of starches and cheese  Maintain topiramate 100 AM/75 PM.    FOLLOW-UP:    12 weeks.  10/15 minutes spent on counseling and education

## 2019-03-25 NOTE — LETTER
"3/25/2019       RE: Marisol Anne  [address]  [facility] [address]     Dear Colleague,    Thank you for referring your patient, Marisol Anne, to the [facility] MEDICAL WEIGHT MANAGEMENT at [facility]. Please see a copy of my visit note below.        Return Medical Weight Management Note     Marisol Anne  MRN:  [id number]  :  1960  BILL:  19    Dear Dr. Gallegos,    I had the pleasure of seeing your patient Marisol Anne.  She is a 55 year old female who I am continuing to see for treatment of obesity related to: Back Pain, Knee Pain, GERD, Depression and anxiety. S/p gastric bypass surgery 12 years ago.     CURRENT WEIGHT:   187 lbs 0 oz    Wt Readings from Last 4 Encounters:   19 84.8 kg (187 lb)   18 89.9 kg (198 lb 1.6 oz)   18 98.2 kg (216 lb 6.4 oz)   17 104.6 kg (230 lb 9.6 oz)     Height:  5' 3\"  Body Mass Index:  Body mass index is 33.13 kg/m .  Vitals:  B/P: 150/76, P: 73    Initial consult weight was 285 on 12/11/15.  Weight change since last seen on 18 is down 11 pounds.   Total loss is 98 pounds.    INTERVAL HISTORY:  Still less snacking and lower starches, topiramate 100 mg AM and 75 PM (dose increased herself from 75 mg BID), does not think this has been helping. She has had tingling and numbness in her hands, but attributes this to carpal tunnel and working with boxes at work. Does occasionally notice blurred vision, unsure if from topiramate or from needing a new prescription for her glasses.     Finds she has been grabbing sweets and pre-packaged foods because she works, and finds this easier. Has been trying to prepare meals in her crock pot 2x/week.     Has struggled with eating vegetables every day. Generally does eat 3 meals per day and snacks between meals, mostly in the evening.     Breakfast - yogurt, toast, orange juice, sometimes scrambled eggs or cereal instead   Lunch - varies, " mostly chicken wings, [employer] turkey sub with chips   Dinner - varies, pizza, sub, chili, chicken noodle soup, Mexican food, [place] food   Snacks - almost always oreos or M&Ms primarily in the morning or evening, but not throughout the day     Gets a lot of exercise with work - [employer] and [employer]. Reports she has not been sleeping well, generally 5-6 hours per night.    Diet and Activity Changes Since Last Visit Reviewed With Patient 3/25/2019   I have made the following changes to my diet since my last visit: none   With regards to my diet, I am still struggling with: sweets   For breakfast, I typically eat: -   For lunch, I typically eat: -   For supper, I typically eat: -   For snack(s), I typically eat: -   I have made the following changes to my activity/exercise since my last visit: work two high activity jobs   With regards to my activity/exercise, I am still struggling with: regular exercise       ROS  + difficulty sleeping, mild headache   - fevers, URI symptoms, chest pain, shortness of breath, abdominal pain, diarrhea, constipation     MEDICATIONS:   Current Outpatient Medications   Medication     ASPIRIN PO     CALCIUM 500/D 500-400 MG-UNIT CHEW     Cholecalciferol (VITAMIN D3 PO)     Cyanocobalamin (B-12) 2500 MCG SUBL     Ferrous Sulfate (IRON) 28 MG TABS     LEVOTHYROXINE SODIUM PO     omeprazole (PRILOSEC) 20 MG CR capsule     Probiotic Product (PRO-BIOTIC BLEND PO)     topiramate (TOPAMAX) 25 MG tablet     Cyanocobalamin (VITAMIN B 12 PO)     omeprazole (PRILOSEC) 20 MG CR capsule     No current facility-administered medications for this visit.        Weight Loss Medication History Reviewed With Patient 3/25/2019   Which weight loss medications are you currently taking on a regular basis?  Topamax (topiramate)   Are you having any side effects from the weight loss medication that we have prescribed you? No   If you are having side effects please describe: -     ASSESSMENT:   Continue current  plan for food change - focus on no between meal snacking, aggressive lowering of starches and cheese  Maintain topiramate 100 AM/75 PM.    FOLLOW-UP:    12 weeks.  10/15 minutes spent on counseling and education      Again, thank you for allowing me to participate in the care of your patient.      Sincerely,    Niraj Johnson MD

## 2019-03-25 NOTE — NURSING NOTE
"  Chief Complaint   Patient presents with     Weight Problem     RMWM     Vitals:    03/25/19 1050   BP: 119/70   Pulse: 64   SpO2: 100%   Weight: 84.8 kg (187 lb)   Height: 1.6 m (5' 3\")     Body mass index is 33.13 kg/m .  Lisa Skinner CMA    "

## 2019-10-07 DIAGNOSIS — E66.01 MORBID OBESITY DUE TO EXCESS CALORIES (H): ICD-10-CM

## 2019-10-10 RX ORDER — TOPIRAMATE 25 MG/1
TABLET, FILM COATED ORAL
Qty: 210 TABLET | Refills: 0 | Status: SHIPPED | OUTPATIENT
Start: 2019-10-10 | End: 2019-10-14

## 2019-10-11 DIAGNOSIS — E66.01 MORBID OBESITY DUE TO EXCESS CALORIES (H): ICD-10-CM

## 2019-10-14 ENCOUNTER — OFFICE VISIT (OUTPATIENT)
Dept: ENDOCRINOLOGY | Facility: CLINIC | Age: 59
End: 2019-10-14
Payer: COMMERCIAL

## 2019-10-14 VITALS
TEMPERATURE: 97.9 F | WEIGHT: 182 LBS | HEIGHT: 63 IN | SYSTOLIC BLOOD PRESSURE: 121 MMHG | HEART RATE: 70 BPM | DIASTOLIC BLOOD PRESSURE: 65 MMHG | BODY MASS INDEX: 32.25 KG/M2 | OXYGEN SATURATION: 98 %

## 2019-10-14 DIAGNOSIS — E66.01 MORBID OBESITY DUE TO EXCESS CALORIES (H): ICD-10-CM

## 2019-10-14 RX ORDER — ZOLPIDEM TARTRATE 5 MG/1
TABLET ORAL
COMMUNITY
Start: 2019-10-13

## 2019-10-14 RX ORDER — TOPIRAMATE 25 MG/1
TABLET, FILM COATED ORAL
Qty: 210 TABLET | Refills: 11 | Status: SHIPPED | OUTPATIENT
Start: 2019-10-14

## 2019-10-14 ASSESSMENT — MIFFLIN-ST. JEOR: SCORE: 1369.68

## 2019-10-14 ASSESSMENT — PAIN SCALES - GENERAL: PAINLEVEL: NO PAIN (0)

## 2019-10-14 NOTE — LETTER
"10/14/2019       RE: Marisol Anne  [address]  [place] [address]     Dear Colleague,    Thank you for referring your patient, Marisol Anne, to the [facility] MEDICAL WEIGHT MANAGEMENT at [facility]. Please see a copy of my visit note below.        Return Medical Weight Management Note     Marisol Anne  MRN:  [id number]  :  1960  BILL:  10/14/19    Dear Dr. Gallegos,    I had the pleasure of seeing your patient Marisol Anne.  She is a 55 year old female who I am continuing to see for treatment of obesity related to: Back Pain, Knee Pain, GERD, Depression and anxiety. S/p gastric bypass surgery 12 years ago.     CURRENT WEIGHT:   182 lbs 0 oz    Wt Readings from Last 4 Encounters:   19 84.8 kg (187 lb)   18 89.9 kg (198 lb 1.6 oz)   18 98.2 kg (216 lb 6.4 oz)   17 104.6 kg (230 lb 9.6 oz)     Height:  5' 3\"  Body Mass Index:  Body mass index is 32.24 kg/m .  Vitals:  B/P: 150/76, P: 73    Initial consult weight was 285 on 12/11/15.  Weight change since last seen on 19 is down 5 pounds.   Total loss is 103 pounds.    INTERVAL HISTORY:  Still less snacking and lower starches, topiramate 100 mg AM and 75 PM (dose increased herself from 75 mg BID), does not think this has been helping. Has struggled with eating vegetables every day. Generally does eat 3 meals per day and snacks between meals, mostly in the evening. Gets a lot of exercise with work - [employer] and [employer]. Reports she has not been sleeping well, generally 5-6 hours per night.    Diet and Activity Changes Since Last Visit Reviewed With Patient 3/25/2019   I have made the following changes to my diet since my last visit: none   With regards to my diet, I am still struggling with: sweets   For breakfast, I typically eat: -   For lunch, I typically eat: -   For supper, I typically eat: -   For snack(s), I typically eat: -   I have made the following changes to " my activity/exercise since my last visit: work two high activity jobs   With regards to my activity/exercise, I am still struggling with: regular exercise     MEDICATIONS:   Current Outpatient Medications   Medication     ASPIRIN PO     CALCIUM 500/D 500-400 MG-UNIT CHEW     Cholecalciferol (VITAMIN D3 PO)     Cyanocobalamin (B-12) 2500 MCG SUBL     Cyanocobalamin (VITAMIN B 12 PO)     Ferrous Sulfate (IRON) 28 MG TABS     LEVOTHYROXINE SODIUM PO     omeprazole (PRILOSEC) 20 MG CR capsule     omeprazole (PRILOSEC) 20 MG CR capsule     Probiotic Product (PRO-BIOTIC BLEND PO)     topiramate (TOPAMAX) 25 MG tablet     No current facility-administered medications for this visit.      Weight Loss Medication History Reviewed With Patient 3/25/2019   Which weight loss medications are you currently taking on a regular basis?  Topamax (topiramate)   Are you having any side effects from the weight loss medication that we have prescribed you? No   If you are having side effects please describe: -     ASSESSMENT:   Continue current plan for food change - focus on no between meal snacking, aggressive lowering of starches and cheese  Maintain topiramate 100 AM/75 PM.    FOLLOW-UP:    12 weeks.  10/15 minutes spent on counseling and education        Again, thank you for allowing me to participate in the care of your patient.      Sincerely,    Niraj Johnson MD

## 2019-10-14 NOTE — NURSING NOTE
"Chief Complaint   Patient presents with     RECHECK     Weight management follow up.       Vitals:    10/14/19 1023   BP: 121/65   BP Location: Left arm   Patient Position: Sitting   Cuff Size: Adult Large   Pulse: 70   Temp: 97.9  F (36.6  C)   TempSrc: Oral   SpO2: 98%   Weight: 82.6 kg (182 lb)   Height: 1.6 m (5' 3\")       Body mass index is 32.24 kg/m .         Marga Ramirez, MINI\    "

## 2019-10-14 NOTE — LETTER
"10/14/2019      RE: Marisol Anne  [address]  [facility] [place] [address]       Return Medical Weight Management Note     Marisol Anne  MRN:  [id number]  :  1960  BILL:  10/14/19    Dear Dr. Gallegos,    I had the pleasure of seeing your patient Marisol Anne.  She is a 55 year old female who I am continuing to see for treatment of obesity related to: Back Pain, Knee Pain, GERD, Depression and anxiety. S/p gastric bypass surgery 12 years ago.     CURRENT WEIGHT:   182 lbs 0 oz    Wt Readings from Last 4 Encounters:   19 84.8 kg (187 lb)   18 89.9 kg (198 lb 1.6 oz)   18 98.2 kg (216 lb 6.4 oz)   17 104.6 kg (230 lb 9.6 oz)     Height:  5' 3\"  Body Mass Index:  Body mass index is 32.24 kg/m .  Vitals:  B/P: 150/76, P: 73    Initial consult weight was 285 on 12/11/15.  Weight change since last seen on 19 is down 5 pounds.   Total loss is 103 pounds.    INTERVAL HISTORY:  Still less snacking and lower starches, topiramate 100 mg AM and 75 PM (dose increased herself from 75 mg BID), does not think this has been helping. Has struggled with eating vegetables every day. Generally does eat 3 meals per day and snacks between meals, mostly in the evening. Gets a lot of exercise with work - [employer] and [employer]. Reports she has not been sleeping well, generally 5-6 hours per night.    Diet and Activity Changes Since Last Visit Reviewed With Patient 3/25/2019   I have made the following changes to my diet since my last visit: none   With regards to my diet, I am still struggling with: sweets   For breakfast, I typically eat: -   For lunch, I typically eat: -   For supper, I typically eat: -   For snack(s), I typically eat: -   I have made the following changes to my activity/exercise since my last visit: work two high activity jobs   With regards to my activity/exercise, I am still struggling with: regular exercise     MEDICATIONS:   Current Outpatient Medications   Medication "     ASPIRIN PO     CALCIUM 500/D 500-400 MG-UNIT CHEW     Cholecalciferol (VITAMIN D3 PO)     Cyanocobalamin (B-12) 2500 MCG SUBL     Cyanocobalamin (VITAMIN B 12 PO)     Ferrous Sulfate (IRON) 28 MG TABS     LEVOTHYROXINE SODIUM PO     omeprazole (PRILOSEC) 20 MG CR capsule     omeprazole (PRILOSEC) 20 MG CR capsule     Probiotic Product (PRO-BIOTIC BLEND PO)     topiramate (TOPAMAX) 25 MG tablet     No current facility-administered medications for this visit.      Weight Loss Medication History Reviewed With Patient 3/25/2019   Which weight loss medications are you currently taking on a regular basis?  Topamax (topiramate)   Are you having any side effects from the weight loss medication that we have prescribed you? No   If you are having side effects please describe: -     ASSESSMENT:   Continue current plan for food change - focus on no between meal snacking, aggressive lowering of starches and cheese  Maintain topiramate 100 AM/75 PM.    FOLLOW-UP:    12 weeks.  10/15 minutes spent on counseling and education      Niraj Johnson MD

## 2019-10-14 NOTE — PROGRESS NOTES
"    Return Medical Weight Management Note     Marisol Anne  MRN:  [id number]  :  1960  BILL:  10/14/19    Dear Dr. Gallegos,    I had the pleasure of seeing your patient Marisol Anne.  She is a 55 year old female who I am continuing to see for treatment of obesity related to: Back Pain, Knee Pain, GERD, Depression and anxiety. S/p gastric bypass surgery 12 years ago.     CURRENT WEIGHT:   182 lbs 0 oz    Wt Readings from Last 4 Encounters:   19 84.8 kg (187 lb)   18 89.9 kg (198 lb 1.6 oz)   18 98.2 kg (216 lb 6.4 oz)   17 104.6 kg (230 lb 9.6 oz)     Height:  5' 3\"  Body Mass Index:  Body mass index is 32.24 kg/m .  Vitals:  B/P: 150/76, P: 73    Initial consult weight was 285 on 12/11/15.  Weight change since last seen on 19 is down 5 pounds.   Total loss is 103 pounds.    INTERVAL HISTORY:  Still less snacking and lower starches, topiramate 100 mg AM and 75 PM (dose increased herself from 75 mg BID), does not think this has been helping. Has struggled with eating vegetables every day. Generally does eat 3 meals per day and snacks between meals, mostly in the evening. Gets a lot of exercise with work - [employer] and [employer]. Reports she has not been sleeping well, generally 5-6 hours per night.    Diet and Activity Changes Since Last Visit Reviewed With Patient 3/25/2019   I have made the following changes to my diet since my last visit: none   With regards to my diet, I am still struggling with: sweets   For breakfast, I typically eat: -   For lunch, I typically eat: -   For supper, I typically eat: -   For snack(s), I typically eat: -   I have made the following changes to my activity/exercise since my last visit: work two high activity jobs   With regards to my activity/exercise, I am still struggling with: regular exercise     MEDICATIONS:   Current Outpatient Medications   Medication     ASPIRIN PO     CALCIUM 500/D 500-400 MG-UNIT CHEW     Cholecalciferol (VITAMIN D3 PO) "     Cyanocobalamin (B-12) 2500 MCG SUBL     Cyanocobalamin (VITAMIN B 12 PO)     Ferrous Sulfate (IRON) 28 MG TABS     LEVOTHYROXINE SODIUM PO     omeprazole (PRILOSEC) 20 MG CR capsule     omeprazole (PRILOSEC) 20 MG CR capsule     Probiotic Product (PRO-BIOTIC BLEND PO)     topiramate (TOPAMAX) 25 MG tablet     No current facility-administered medications for this visit.      Weight Loss Medication History Reviewed With Patient 3/25/2019   Which weight loss medications are you currently taking on a regular basis?  Topamax (topiramate)   Are you having any side effects from the weight loss medication that we have prescribed you? No   If you are having side effects please describe: -     ASSESSMENT:   Continue current plan for food change - focus on no between meal snacking, aggressive lowering of starches and cheese  Maintain topiramate 100 AM/75 PM.    FOLLOW-UP:    12 weeks.  10/15 minutes spent on counseling and education

## 2019-10-16 DIAGNOSIS — E66.01 MORBID OBESITY DUE TO EXCESS CALORIES (H): ICD-10-CM

## 2019-10-17 RX ORDER — TOPIRAMATE 25 MG/1
TABLET, FILM COATED ORAL
Qty: 630 TABLET | Refills: 3 | Status: SHIPPED | OUTPATIENT
Start: 2019-10-17

## 2019-10-17 NOTE — TELEPHONE ENCOUNTER
Patient seen in clinic by Dr. Johnson 10/14/19 with instructions to continue Topiramate 100mg in AM and 75mg in PM. Pharmacy requesting 90 day supply. Sending new rx to pharmacy.   
08-Jun-2019 09:13

## 2019-10-20 RX ORDER — TOPIRAMATE 25 MG/1
TABLET, FILM COATED ORAL
Qty: 630 TABLET | Refills: 11 | OUTPATIENT
Start: 2019-10-20

## 2020-03-02 ENCOUNTER — HEALTH MAINTENANCE LETTER (OUTPATIENT)
Age: 60
End: 2020-03-02

## 2020-04-14 ENCOUNTER — TELEPHONE (OUTPATIENT)
Dept: ENDOCRINOLOGY | Facility: CLINIC | Age: 60
End: 2020-04-14

## 2020-05-04 ENCOUNTER — VIRTUAL VISIT (OUTPATIENT)
Dept: ENDOCRINOLOGY | Facility: CLINIC | Age: 60
End: 2020-05-04
Payer: COMMERCIAL

## 2020-05-04 VITALS — WEIGHT: 178.4 LBS | HEIGHT: 63 IN | BODY MASS INDEX: 31.61 KG/M2

## 2020-05-04 DIAGNOSIS — E66.01 MORBID OBESITY (H): Primary | ICD-10-CM

## 2020-05-04 ASSESSMENT — MIFFLIN-ST. JEOR: SCORE: 1348.35

## 2020-05-04 ASSESSMENT — PAIN SCALES - GENERAL: PAINLEVEL: NO PAIN (0)

## 2020-05-04 NOTE — NURSING NOTE
"Chief Complaint   Patient presents with     RECHECK     Follow up weight management.       Vitals:    05/04/20 1035   Weight: 80.9 kg (178 lb 6.4 oz)   Height: 1.6 m (5' 3\")       Body mass index is 31.6 kg/m .                            JUANJOSE EVANS, EMT    "

## 2020-05-04 NOTE — PROGRESS NOTES
"    Return Medical Weight Management Note     Marisol Anne  MRN:  [id number]  :  1960  BILL:  20    Dear Dr. Gallegos,    I had the pleasure of seeing your patient Marisol Anne.  She is a 55 year old female who I am continuing to see for treatment of obesity related to: Back Pain, Knee Pain, GERD, Depression and anxiety. S/p gastric bypass surgery 12 years ago.     CURRENT WEIGHT:   178 lbs 6.4 oz    Wt Readings from Last 4 Encounters:   20 80.9 kg (178 lb 6.4 oz)   10/14/19 82.6 kg (182 lb)   19 84.8 kg (187 lb)   18 89.9 kg (198 lb 1.6 oz)     Height:  5' 3\"  Body Mass Index:  Body mass index is 31.6 kg/m .  Vitals:  B/P: 150/76, P: 73    Initial consult weight was 285 on 12/11/15.   Weight change since last seen on 10/14/19 is down 5 pounds.   Total loss is 107 pounds.    INTERVAL HISTORY:  Still less snacking and lower starches, topiramate 75 mg AM and 75 PM, does not think this has been helping. Has struggled with eating vegetables every day. Generally does eat 3 meals per day and snacks between meals, mostly in the evening. Gets a lot of exercise with work - [employer] and [employer]. Reports she has not been sleeping well, generally 5-6 hours per night.    No flowsheet data found.  MEDICATIONS:   Current Outpatient Medications   Medication     ASPIRIN PO     CALCIUM 500/D 500-400 MG-UNIT CHEW     Cholecalciferol (VITAMIN D3 PO)     Cyanocobalamin (B-12) 2500 MCG SUBL     Cyanocobalamin (VITAMIN B 12 PO)     Ferrous Sulfate (IRON) 28 MG TABS     LEVOTHYROXINE SODIUM PO     omeprazole (PRILOSEC) 20 MG CR capsule     omeprazole (PRILOSEC) 20 MG CR capsule     Probiotic Product (PRO-BIOTIC BLEND PO)     topiramate (TOPAMAX) 25 MG tablet     topiramate (TOPAMAX) 25 MG tablet     zolpidem (AMBIEN) 5 MG tablet     No current facility-administered medications for this visit.      Weight Loss Medication History Reviewed With Patient 2020   Which weight loss medications are you currently " "taking on a regular basis?  Topamax (topiramate)   Are you having any side effects from the weight loss medication that we have prescribed you? No   If you are having side effects please describe: -     ASSESSMENT:   Continue current plan for food change - focus on no between meal snacking, aggressive lowering of starches and cheese. Maintain topiramate 75 BID.    FOLLOW-UP:    12 weeks.  Video call duration: 15 minutes.  I explained the conditions and plans (more than 50% of time was counseling/coordination of weight management).    Sincerely,      Niraj Johnson MD    The patient was evaluated via a billable video visit and notified of following: \"This visit will be via video call between you and your physician. If lab work is needed we can place an order and you can later stop by the lab. Video visits are billed at different rates depending on your insurance coverage.  Please reach out to your insurance provider with any questions. If the physician feels a video visit is not appropriate, you will not be charged for this service.\" Patient has given verbal consent for Video visit? Yes. How would you like to obtain your AVS? MyChart.  "

## 2020-10-17 DIAGNOSIS — E66.01 MORBID OBESITY DUE TO EXCESS CALORIES (H): ICD-10-CM

## 2020-10-20 RX ORDER — TOPIRAMATE 25 MG/1
TABLET, FILM COATED ORAL
Qty: 630 TABLET | Refills: 3 | OUTPATIENT
Start: 2020-10-20

## 2020-10-20 NOTE — TELEPHONE ENCOUNTER
topiramate (TOPAMAX) 25 MG tablet  TAKE 4 TABLETS BY MOUTH EVERY MORNING AND 3 TABLETS EVERY EVENING * Patient requests 90 days supply    Last Written Prescription Date:  10/17/19  Last Fill Quantity: 630,   # refills: 3  Last Office Visit : 5/4/20 virtual visit    Return in about 3 months (around 8/4/2020).  Maintain topiramate 75 BID.  Future Office visit:  none    Routing refill request to provider for review/approval because:  Appointment needed.   Abnormal outside lab - CBC   Overdue ALT or AST   Inconsistent dose/directions

## 2020-10-21 ENCOUNTER — TELEPHONE (OUTPATIENT)
Dept: ENDOCRINOLOGY | Facility: CLINIC | Age: 60
End: 2020-10-21

## 2020-10-21 NOTE — TELEPHONE ENCOUNTER
Refill request denied at this time. Patient needs appointment. Mapflow message sent to patient to schedule virtual visit with Dr. Jonhson.

## 2020-12-14 ENCOUNTER — HEALTH MAINTENANCE LETTER (OUTPATIENT)
Age: 60
End: 2020-12-14

## 2021-04-18 ENCOUNTER — HEALTH MAINTENANCE LETTER (OUTPATIENT)
Age: 61
End: 2021-04-18

## 2021-10-02 ENCOUNTER — HEALTH MAINTENANCE LETTER (OUTPATIENT)
Age: 61
End: 2021-10-02

## 2022-05-14 ENCOUNTER — HEALTH MAINTENANCE LETTER (OUTPATIENT)
Age: 62
End: 2022-05-14

## 2022-09-03 ENCOUNTER — HEALTH MAINTENANCE LETTER (OUTPATIENT)
Age: 62
End: 2022-09-03

## 2023-01-15 ENCOUNTER — HEALTH MAINTENANCE LETTER (OUTPATIENT)
Age: 63
End: 2023-01-15

## 2023-06-03 ENCOUNTER — HEALTH MAINTENANCE LETTER (OUTPATIENT)
Age: 63
End: 2023-06-03

## 2024-02-17 ENCOUNTER — HEALTH MAINTENANCE LETTER (OUTPATIENT)
Age: 64
End: 2024-02-17